# Patient Record
Sex: MALE | Race: WHITE
[De-identification: names, ages, dates, MRNs, and addresses within clinical notes are randomized per-mention and may not be internally consistent; named-entity substitution may affect disease eponyms.]

---

## 2022-01-21 ENCOUNTER — HOSPITAL ENCOUNTER (INPATIENT)
Dept: HOSPITAL 19 - SURG | Age: 87
LOS: 10 days | Discharge: NURSING, REHAB OR LONG-TERM CARE | DRG: 444 | End: 2022-01-31
Attending: INTERNAL MEDICINE | Admitting: INTERNAL MEDICINE
Payer: MEDICARE

## 2022-01-21 VITALS — HEIGHT: 70 IN | BODY MASS INDEX: 40.4 KG/M2 | WEIGHT: 282.19 LBS

## 2022-01-21 VITALS — DIASTOLIC BLOOD PRESSURE: 57 MMHG | TEMPERATURE: 99.9 F | HEART RATE: 90 BPM | SYSTOLIC BLOOD PRESSURE: 122 MMHG

## 2022-01-21 DIAGNOSIS — Z79.82: ICD-10-CM

## 2022-01-21 DIAGNOSIS — I25.10: ICD-10-CM

## 2022-01-21 DIAGNOSIS — E66.2: ICD-10-CM

## 2022-01-21 DIAGNOSIS — J96.22: ICD-10-CM

## 2022-01-21 DIAGNOSIS — N47.1: ICD-10-CM

## 2022-01-21 DIAGNOSIS — H91.90: ICD-10-CM

## 2022-01-21 DIAGNOSIS — L30.4: ICD-10-CM

## 2022-01-21 DIAGNOSIS — I13.0: ICD-10-CM

## 2022-01-21 DIAGNOSIS — M10.9: ICD-10-CM

## 2022-01-21 DIAGNOSIS — N18.9: ICD-10-CM

## 2022-01-21 DIAGNOSIS — E87.2: ICD-10-CM

## 2022-01-21 DIAGNOSIS — N48.1: ICD-10-CM

## 2022-01-21 DIAGNOSIS — Z20.822: ICD-10-CM

## 2022-01-21 DIAGNOSIS — Z79.891: ICD-10-CM

## 2022-01-21 DIAGNOSIS — Z66: ICD-10-CM

## 2022-01-21 DIAGNOSIS — R41.0: ICD-10-CM

## 2022-01-21 DIAGNOSIS — R65.10: ICD-10-CM

## 2022-01-21 DIAGNOSIS — E03.9: ICD-10-CM

## 2022-01-21 DIAGNOSIS — Z99.81: ICD-10-CM

## 2022-01-21 DIAGNOSIS — I50.9: ICD-10-CM

## 2022-01-21 DIAGNOSIS — J44.1: ICD-10-CM

## 2022-01-21 DIAGNOSIS — N17.9: ICD-10-CM

## 2022-01-21 DIAGNOSIS — K80.20: Primary | ICD-10-CM

## 2022-01-21 PROCEDURE — 5A09457 ASSISTANCE WITH RESPIRATORY VENTILATION, 24-96 CONSECUTIVE HOURS, CONTINUOUS POSITIVE AIRWAY PRESSURE: ICD-10-PCS | Performed by: STUDENT IN AN ORGANIZED HEALTH CARE EDUCATION/TRAINING PROGRAM

## 2022-01-22 VITALS — DIASTOLIC BLOOD PRESSURE: 56 MMHG | HEART RATE: 109 BPM | TEMPERATURE: 98.1 F | SYSTOLIC BLOOD PRESSURE: 114 MMHG

## 2022-01-22 VITALS — HEART RATE: 86 BPM | SYSTOLIC BLOOD PRESSURE: 109 MMHG | TEMPERATURE: 98.8 F | DIASTOLIC BLOOD PRESSURE: 48 MMHG

## 2022-01-22 VITALS — TEMPERATURE: 97.5 F | SYSTOLIC BLOOD PRESSURE: 104 MMHG | DIASTOLIC BLOOD PRESSURE: 49 MMHG | HEART RATE: 78 BPM

## 2022-01-22 VITALS — DIASTOLIC BLOOD PRESSURE: 43 MMHG | HEART RATE: 92 BPM | SYSTOLIC BLOOD PRESSURE: 115 MMHG | TEMPERATURE: 97.2 F

## 2022-01-22 VITALS — HEART RATE: 83 BPM | SYSTOLIC BLOOD PRESSURE: 97 MMHG | TEMPERATURE: 99.3 F | DIASTOLIC BLOOD PRESSURE: 55 MMHG

## 2022-01-22 VITALS — HEART RATE: 91 BPM | SYSTOLIC BLOOD PRESSURE: 108 MMHG | DIASTOLIC BLOOD PRESSURE: 57 MMHG | TEMPERATURE: 98.6 F

## 2022-01-22 VITALS — SYSTOLIC BLOOD PRESSURE: 113 MMHG | TEMPERATURE: 99.2 F | HEART RATE: 90 BPM | DIASTOLIC BLOOD PRESSURE: 64 MMHG

## 2022-01-22 LAB
ANION GAP SERPL CALC-SCNC: 13 MMOL/L (ref 7–16)
BASE EXCESS BLDA CALC-SCNC: 0.7 MMOL/L (ref -2–2)
BASE EXCESS BLDA CALC-SCNC: 2.4 MMOL/L (ref -2–2)
BASOPHILS # BLD: 0 K/MM3 (ref 0–0.2)
BASOPHILS NFR BLD AUTO: 0.2 % (ref 0–2)
BUN SERPL-MCNC: 35 MG/DL (ref 8–26)
CALCIUM SERPL-MCNC: 9 MG/DL (ref 8.4–10.2)
CHLORIDE SERPL-SCNC: 102 MMOL/L (ref 98–107)
CO2 BLDA-SCNC: 34.4 MMOL/L
CO2 BLDA-SCNC: 35.6 MMOL/L
CO2 SERPL-SCNC: 25 MMOL/L (ref 23–31)
CREAT SERPL-SCNC: 1.02 MG/DL (ref 0.72–1.25)
EOSINOPHIL # BLD: 0 K/MM3 (ref 0–0.7)
EOSINOPHIL NFR BLD: 0.1 % (ref 0–4)
ERYTHROCYTE [DISTWIDTH] IN BLOOD BY AUTOMATED COUNT: 14.2 % (ref 11.5–14.5)
GLUCOSE SERPL-MCNC: 92 MG/DL (ref 70–99)
GRANULOCYTES # BLD AUTO: 86.6 % (ref 42.2–75.2)
HCO3 BLDA-SCNC: 31.7 MEQ/L (ref 22–26)
HCO3 BLDA-SCNC: 33 MEQ/L (ref 22–26)
HCT VFR BLD AUTO: 44.9 % (ref 42–52)
HGB BLD-MCNC: 13.7 G/DL (ref 13.5–18)
INHALED O2 CONCENTRATION: 50 %
LYMPHOCYTES # BLD: 0.7 K/MM3 (ref 1.2–3.4)
LYMPHOCYTES NFR BLD: 4 % (ref 20–51)
MAGNESIUM SERPL-MCNC: 2.1 MG/DL (ref 1.6–2.6)
MCH RBC QN AUTO: 29 PG (ref 27–31)
MCHC RBC AUTO-ENTMCNC: 31 G/DL (ref 33–37)
MCV RBC AUTO: 94 FL (ref 80–100)
MONOCYTES # BLD: 1.4 K/MM3 (ref 0.1–0.6)
MONOCYTES NFR BLD AUTO: 8.5 % (ref 1.7–9.3)
NEUTROPHILS # BLD: 14.2 K/MM3 (ref 1.4–6.5)
PCO2 BLDA: 84.1 MMHG (ref 35–45)
PCO2 BLDA: 86.9 MMHG (ref 35–45)
PHOSPHATE SERPL-MCNC: 4 MG/DL (ref 2.3–4.7)
PLATELET # BLD AUTO: 214 K/MM3 (ref 130–400)
PMV BLD AUTO: 11.9 FL (ref 7.4–10.4)
PO2 BLDA: 65.9 MMHG (ref 80–100)
PO2 BLDA: 76.8 MMHG (ref 80–100)
POTASSIUM SERPL-SCNC: 4.7 MMOL/L (ref 3.5–4.5)
RBC # BLD AUTO: 4.79 M/MM3 (ref 4.2–5.6)
SAO2 % BLDA: 93.2 % (ref 92–100)
SAO2 % BLDA: 94.9 % (ref 92–100)
SODIUM SERPL-SCNC: 140 MMOL/L (ref 136–145)

## 2022-01-22 NOTE — NUR
Bedside shift report received, assumed care for night shift. Assessment
complete. A&Ox4-very drowsy and lethargic. MIKE Sheth-Hospitalist has been in
to evaluate patient due to increased O2 needs/tachypnea. O2 currently at
6L/HFNC with O2 sats in mid 90s. Denies pain/nausea. Lungs with fine crackles
all fields. Plan of care discussed for this shift to include meds/O2/breathing
tx/calling for questions or concerns. Call light in reach. Will monitor.

## 2022-01-22 NOTE — NUR
PT'S PO MEDS WERE HELD THIS MORNING DUE TO PATIENT BEING NPO FOR POSSIBLE
PROCEDURE. DR. ROMERO DOES NOT PLAN TO PROCEED WITH PROCEDURE, PATIENT HAS
BEEN ADVANCED TO LIQUID DIET AND ORAL MEDS ARE NOW BEING ADMINISTERED.
HOWEVER, PATIENT'S BLOOD PRESSURE IS SOFT (108/57) AND RN CONSULTED WITH DR. MURRELL WHO GAVE VERBAL ORDER TO HOLD ORAL BLOOD PRESSURE MEDS BUT ADMINISTER
LASIX.

## 2022-01-22 NOTE — NUR
SW met with patient to complete intake. Patient states that he lives alone and
his next of contact is daughter Ashley [phone]. Patient provides that he
uses a walker currently, and states for the most part is independent with ADLs
Patient provides that he does not utilize any home health services at this
time. Patient provides that his PCP is Dr Yanes, pharmacy is [facility],
and DPOA is his daugther which he has documenation at his home. Patient's plan
is to return to his home up on DC and has no concerns with doing so. SW will
continue to follow.
 
DC plan: home/lives alone (may need placement)

## 2022-01-22 NOTE — NUR
PATIENT TO FLOOR AROUND 2000 BY EMS STAFF. VSS. ASSESSMENT DONE. EMAR
COMPLETED WITH HELP OF PATIENTS DAUGHTER, NORI OVER THE PHONE. DENIES PAIN.
PATIENT WAS INCONTINENT OF URINE AND LINENS WERE CHANGED. PATIENT NOW WEARING
BRIEF. RED AND EXCORIATED IN SKIN FOLDS AND ON THE TIP OF THE PENIS/FORESKIN.
20 G TO L FA WITH IV ABX INFUSING INTERMITTENTLY. HAS BEEN NPO SINCE MIDNIGHT.
4 L O2 INFUSING VIA NC. HAS BEEN RESTING IN BED.

## 2022-01-23 VITALS — DIASTOLIC BLOOD PRESSURE: 43 MMHG | HEART RATE: 78 BPM | SYSTOLIC BLOOD PRESSURE: 104 MMHG | TEMPERATURE: 98 F

## 2022-01-23 VITALS — DIASTOLIC BLOOD PRESSURE: 38 MMHG | TEMPERATURE: 97.2 F | HEART RATE: 95 BPM | SYSTOLIC BLOOD PRESSURE: 117 MMHG

## 2022-01-23 VITALS — TEMPERATURE: 98.9 F | HEART RATE: 86 BPM | SYSTOLIC BLOOD PRESSURE: 94 MMHG | DIASTOLIC BLOOD PRESSURE: 48 MMHG

## 2022-01-23 VITALS — TEMPERATURE: 99.1 F | HEART RATE: 65 BPM | SYSTOLIC BLOOD PRESSURE: 116 MMHG | DIASTOLIC BLOOD PRESSURE: 56 MMHG

## 2022-01-23 VITALS — HEART RATE: 78 BPM | SYSTOLIC BLOOD PRESSURE: 136 MMHG | TEMPERATURE: 98.4 F | DIASTOLIC BLOOD PRESSURE: 69 MMHG

## 2022-01-23 LAB
ANION GAP SERPL CALC-SCNC: 13 MMOL/L (ref 7–16)
BASE EXCESS BLDA CALC-SCNC: -1.2 MMOL/L (ref -2–2)
BASE EXCESS BLDA CALC-SCNC: 1.2 MMOL/L (ref -2–2)
BASE EXCESS BLDA CALC-SCNC: 3.7 MMOL/L (ref -2–2)
BASOPHILS # BLD: 0 K/MM3 (ref 0–0.2)
BASOPHILS NFR BLD AUTO: 0.1 % (ref 0–2)
BUN SERPL-MCNC: 42 MG/DL (ref 8–26)
CALCIUM SERPL-MCNC: 8.6 MG/DL (ref 8.4–10.2)
CHLORIDE SERPL-SCNC: 100 MMOL/L (ref 98–107)
CO2 BLDA-SCNC: 28.9 MMOL/L
CO2 BLDA-SCNC: 29.9 MMOL/L
CO2 BLDA-SCNC: 34.4 MMOL/L
CO2 SERPL-SCNC: 25 MMOL/L (ref 23–31)
CREAT SERPL-SCNC: 1.33 MG/DL (ref 0.72–1.25)
EOSINOPHIL # BLD: 0 K/MM3 (ref 0–0.7)
EOSINOPHIL NFR BLD: 0 % (ref 0–4)
ERYTHROCYTE [DISTWIDTH] IN BLOOD BY AUTOMATED COUNT: 14.3 % (ref 11.5–14.5)
GLUCOSE SERPL-MCNC: 144 MG/DL (ref 70–99)
GRANULOCYTES # BLD AUTO: 84.1 % (ref 42.2–75.2)
HCO3 BLDA-SCNC: 27 MEQ/L (ref 22–26)
HCO3 BLDA-SCNC: 28.2 MEQ/L (ref 22–26)
HCO3 BLDA-SCNC: 32.3 MEQ/L (ref 22–26)
HCT VFR BLD AUTO: 40.3 % (ref 42–52)
HGB BLD-MCNC: 12 G/DL (ref 13.5–18)
INHALED O2 CONCENTRATION: 50 %
INHALED O2 CONCENTRATION: 50 %
LYMPHOCYTES # BLD: 0.7 K/MM3 (ref 1.2–3.4)
LYMPHOCYTES NFR BLD: 5 % (ref 20–51)
MCH RBC QN AUTO: 28 PG (ref 27–31)
MCHC RBC AUTO-ENTMCNC: 30 G/DL (ref 33–37)
MCV RBC AUTO: 94 FL (ref 80–100)
MONOCYTES # BLD: 1.5 K/MM3 (ref 0.1–0.6)
MONOCYTES NFR BLD AUTO: 10.1 % (ref 1.7–9.3)
NEUTROPHILS # BLD: 12.2 K/MM3 (ref 1.4–6.5)
PCO2 BLDA: 55.6 MMHG (ref 35–45)
PCO2 BLDA: 61.6 MMHG (ref 35–45)
PCO2 BLDA: 69.1 MMHG (ref 35–45)
PLATELET # BLD AUTO: 272 K/MM3 (ref 130–400)
PMV BLD AUTO: 11 FL (ref 7.4–10.4)
PO2 BLDA: 65.6 MMHG (ref 80–100)
PO2 BLDA: 67.4 MMHG (ref 80–100)
PO2 BLDA: 69.7 MMHG (ref 80–100)
POTASSIUM SERPL-SCNC: 5.2 MMOL/L (ref 3.5–4.5)
RBC # BLD AUTO: 4.31 M/MM3 (ref 4.2–5.6)
SAO2 % BLDA: 93.5 % (ref 92–100)
SAO2 % BLDA: 94.3 % (ref 92–100)
SAO2 % BLDA: 94.3 % (ref 92–100)
SODIUM SERPL-SCNC: 138 MMOL/L (ref 136–145)

## 2022-01-23 NOTE — NUR
Patient assessed around 2035. Alert, oriented to self. Redirected to time,
place, and situation, but forgetful. Denies pain and discomfort. INT to left
hand. IV ABX running per orders. Denies SOB and dyspnea. On BIPAP per orders.
Indwelling ritter catheter patent, draining clear yellow urine via dependent
drainage. 3+ edema BLE. In bed with call light within reach. Bed alarm on.

## 2022-01-23 NOTE — NUR
THUY informed patient would need assistance finding placement due to clinical
concerns. THUY called daughter Ashley who stated she understands the concern
for placement and would prefer a facilty that is closer to her. THUY asked
daugher of choices she prefered and daughter stated [facility] in [place] or
[facility]. THUY faxed ref documentation to both facilities. THUY
will continue to follow.

## 2022-01-23 NOTE — NUR
Much more alert now. BiPaP continues and tolerating well. Repositioned in bed
at this time-domingo care given. Incontinent of a large amount of urine. Denies
current needs. Call light in reach. Will monitor.

## 2022-01-23 NOTE — NUR
CPAP REMOVED FOR PATIENT TO EAT BREAKFAST. TRAY SET UP AND PATIENT ASSISTED
WITH LIQUID DIET FOR BREAKFAST. PATIENT STATES HE IS VERY THIRSTY. PATIENT
NOTED TO BE VERY UNSTEADY AND SHAKING/DROPPING ITEMS WHILTE ATTEMPTING TO EAT.

## 2022-01-23 NOTE — NUR
PATIENT IS RESTING COMFORTABLY IN BED WITH CPAP IN PLACE. PATIENT IS ALERT AND
ORIENTED TO PERSON. PATIENT DENIES ANY NEEDS AT THIS TIME. WILL CONTINUE TO
MONITOR.

## 2022-01-23 NOTE — NUR
Patient remains on BiPaP and is tolerating well. Denies pain/nausea/shortness
of breath. Vs remain stable.

## 2022-01-24 VITALS — TEMPERATURE: 98.3 F | DIASTOLIC BLOOD PRESSURE: 83 MMHG | HEART RATE: 94 BPM | SYSTOLIC BLOOD PRESSURE: 139 MMHG

## 2022-01-24 VITALS — SYSTOLIC BLOOD PRESSURE: 122 MMHG | DIASTOLIC BLOOD PRESSURE: 79 MMHG | TEMPERATURE: 98.5 F | HEART RATE: 84 BPM

## 2022-01-24 VITALS — TEMPERATURE: 98.3 F | HEART RATE: 116 BPM | SYSTOLIC BLOOD PRESSURE: 125 MMHG | DIASTOLIC BLOOD PRESSURE: 58 MMHG

## 2022-01-24 VITALS — DIASTOLIC BLOOD PRESSURE: 56 MMHG | SYSTOLIC BLOOD PRESSURE: 129 MMHG | HEART RATE: 94 BPM | TEMPERATURE: 98.4 F

## 2022-01-24 VITALS — TEMPERATURE: 98.4 F | SYSTOLIC BLOOD PRESSURE: 138 MMHG | HEART RATE: 95 BPM | DIASTOLIC BLOOD PRESSURE: 73 MMHG

## 2022-01-24 VITALS — SYSTOLIC BLOOD PRESSURE: 107 MMHG | HEART RATE: 75 BPM | TEMPERATURE: 97.9 F | DIASTOLIC BLOOD PRESSURE: 66 MMHG

## 2022-01-24 VITALS — TEMPERATURE: 98.3 F | HEART RATE: 106 BPM | SYSTOLIC BLOOD PRESSURE: 95 MMHG | DIASTOLIC BLOOD PRESSURE: 46 MMHG

## 2022-01-24 LAB
ANION GAP SERPL CALC-SCNC: 12 MMOL/L (ref 7–16)
BASE EXCESS BLDA CALC-SCNC: 2.4 MMOL/L (ref -2–2)
BUN SERPL-MCNC: 41 MG/DL (ref 8–26)
CALCIUM SERPL-MCNC: 9.2 MG/DL (ref 8.4–10.2)
CHLORIDE SERPL-SCNC: 99 MMOL/L (ref 98–107)
CO2 BLDA-SCNC: 27 MMOL/L
CO2 SERPL-SCNC: 26 MMOL/L (ref 23–31)
CREAT SERPL-SCNC: 1.14 MG/DL (ref 0.72–1.25)
ERYTHROCYTE [DISTWIDTH] IN BLOOD BY AUTOMATED COUNT: 13.6 % (ref 11.5–14.5)
GLUCOSE SERPL-MCNC: 158 MG/DL (ref 70–99)
HCO3 BLDA-SCNC: 25.8 MEQ/L (ref 22–26)
HCT VFR BLD AUTO: 39.8 % (ref 42–52)
HGB BLD-MCNC: 12.5 G/DL (ref 13.5–18)
INHALED O2 CONCENTRATION: 45 %
LYMPHOCYTES NFR BLD MANUAL: 2 % (ref 20–51)
MCH RBC QN AUTO: 29 PG (ref 27–31)
MCHC RBC AUTO-ENTMCNC: 31 G/DL (ref 33–37)
MCV RBC AUTO: 91 FL (ref 80–100)
NEUTS BAND NFR BLD: 4 % (ref 0–10)
NEUTS SEG NFR BLD MANUAL: 94 % (ref 42–75.2)
PCO2 BLDA: 36.5 MMHG (ref 35–45)
PLATELET # BLD AUTO: 269 K/MM3 (ref 130–400)
PLATELET BLD QL SMEAR: NORMAL
PMV BLD AUTO: 10.8 FL (ref 7.4–10.4)
PO2 BLDA: 58.4 MMHG (ref 80–100)
POTASSIUM SERPL-SCNC: 4.8 MMOL/L (ref 3.5–4.5)
RBC # BLD AUTO: 4.39 M/MM3 (ref 4.2–5.6)
SAO2 % BLDA: 93.3 % (ref 92–100)
SODIUM SERPL-SCNC: 137 MMOL/L (ref 136–145)

## 2022-01-24 NOTE — NUR
Report received. Pt restless in bed, pulling at IV and BIPAP mask. Incontinent
of loose stool, cleaned up and repositioned in bed. Remains agitated and
difficult to console.

## 2022-01-24 NOTE — NUR
Pt more alert, calm, and cooperative throughout day. Partially oriented. At
this time, pt taken off BIPAP and placed on 6 lpm O2 HFNC with sats 91-93%.

## 2022-01-24 NOTE — NUR
Patient has been awake most of shift. Has been pulling at BIPAP and taking off
multiple times. Each time staff redirected which would only help for short
periods of time. Denies pain and discomfort. In bed with call light within
reach. Bed alarm on.

## 2022-01-24 NOTE — NUR
PATIENT DOING WELL TONIGHT. VERY DROWSY. STARTED AT 5 L O2 VIA NC THIS EVENING
AND WAS PLACED ON BIPAP FOR HS. NO S/S OF PAIN NOTED. IV TO L WRIST PATENT AND
IV ABX INFUSING INTERMITTENTLY. DESENEX APPLIED IN SKIN FOLDS AND CREAM
APPLIED TO PENIS. ART TO DD WITH CLEAR YELLOW URINE OUT. RESTING IN BED,
CALL LIGHT WITHIN REACH.

## 2022-01-24 NOTE — NUR
Shift assessment complete. Pt lying in bed, has been agitated this morning and
pulled BIPAP off and IV out. IV catheter tip intact, site w/o s/s
complication. O2 sats 97% on RA, RT notified and okay with keeping BIPAP off
for now. Pt calmer and more cooperative at this time. Drowsy and disoriented.
New 20 gauge IV started to left wrist and secured w/tape and ACE wrap.
Breathing shallow and labored w/repositioning. Bed alarm on and  call light in
reach.

## 2022-01-24 NOTE — NUR
contacted Yunior at [facility] who advised they are
currently reviewing referral and may have openings later this week. THUY
contacted [facility] and was advised they are currently full.
 
THUY followed up with patient's daughter, Ashley who advised her brother, Toby
(ph#[phone]) is at bedside and was advised by the physician that a [facility]
eval may be necessary. THUY followed up with Toby who stated he was agreeable to
this. Toby and Ashley prefer the [place] location, however THUY advised that
this may not be possible depending on bed availability. Toby verbalized
understanding. THUY contacted Chris at [facility] [place] and faxed referral.
 
Discharge Plan: [facility]

## 2022-01-25 VITALS — HEART RATE: 87 BPM | SYSTOLIC BLOOD PRESSURE: 108 MMHG | DIASTOLIC BLOOD PRESSURE: 63 MMHG | TEMPERATURE: 98.3 F

## 2022-01-25 VITALS — SYSTOLIC BLOOD PRESSURE: 119 MMHG | HEART RATE: 80 BPM | TEMPERATURE: 97.6 F | DIASTOLIC BLOOD PRESSURE: 74 MMHG

## 2022-01-25 VITALS — HEART RATE: 72 BPM | TEMPERATURE: 98.6 F | SYSTOLIC BLOOD PRESSURE: 105 MMHG | DIASTOLIC BLOOD PRESSURE: 69 MMHG

## 2022-01-25 VITALS — DIASTOLIC BLOOD PRESSURE: 71 MMHG | HEART RATE: 85 BPM | SYSTOLIC BLOOD PRESSURE: 119 MMHG | TEMPERATURE: 98.1 F

## 2022-01-25 VITALS — TEMPERATURE: 97.7 F | HEART RATE: 97 BPM | DIASTOLIC BLOOD PRESSURE: 61 MMHG | SYSTOLIC BLOOD PRESSURE: 126 MMHG

## 2022-01-25 LAB
ALBUMIN SERPL-MCNC: 2.5 GM/DL (ref 3.4–4.8)
ALP SERPL-CCNC: 111 U/L (ref 40–150)
ALT SERPL-CCNC: 61 U/L (ref 0–55)
ANION GAP SERPL CALC-SCNC: 13 MMOL/L (ref 7–16)
AST SERPL-CCNC: 72 U/L (ref 5–34)
BILIRUB SERPL-MCNC: 0.4 MG/DL (ref 0.2–1.2)
BUN SERPL-MCNC: 49 MG/DL (ref 8–26)
CALCIUM SERPL-MCNC: 9.3 MG/DL (ref 8.4–10.2)
CHLORIDE SERPL-SCNC: 102 MMOL/L (ref 98–107)
CO2 SERPL-SCNC: 26 MMOL/L (ref 23–31)
CREAT SERPL-SCNC: 1.44 MG/DL (ref 0.72–1.25)
ERYTHROCYTE [DISTWIDTH] IN BLOOD BY AUTOMATED COUNT: 13.6 % (ref 11.5–14.5)
GLUCOSE SERPL-MCNC: 119 MG/DL (ref 70–99)
HCT VFR BLD AUTO: 40.2 % (ref 42–52)
HGB BLD-MCNC: 12.4 G/DL (ref 13.5–18)
LYMPHOCYTES NFR BLD MANUAL: 5 % (ref 20–51)
MCH RBC QN AUTO: 28 PG (ref 27–31)
MCHC RBC AUTO-ENTMCNC: 31 G/DL (ref 33–37)
MCV RBC AUTO: 91 FL (ref 80–100)
MONOCYTES NFR BLD: 4 % (ref 1.7–9.3)
NEUTS BAND NFR BLD: 5 % (ref 0–10)
NEUTS SEG NFR BLD MANUAL: 86 % (ref 42–75.2)
PLATELET # BLD AUTO: 295 K/MM3 (ref 130–400)
PLATELET BLD QL SMEAR: NORMAL
PMV BLD AUTO: 10.6 FL (ref 7.4–10.4)
POTASSIUM SERPL-SCNC: 4.9 MMOL/L (ref 3.5–4.5)
PROT SERPL-MCNC: 5.9 GM/DL (ref 6.2–8.1)
RBC # BLD AUTO: 4.44 M/MM3 (ref 4.2–5.6)
SODIUM SERPL-SCNC: 141 MMOL/L (ref 136–145)

## 2022-01-25 NOTE — NUR
Pt doing well tonight.  He is not having any pain complaints during
assessment.  Just went in to reposition pt and he had some complaints of his
back hurting, denies any needs.  Pt does have redness under skin folds, powder
applied.  Catheter care completed and ointment applied per order.  No needs
verbalized, call light within reach

## 2022-01-25 NOTE — NUR
followed up with Chris who advised patient is approved to admit,
however they are waiting on bed availability.
 
Discharge Plan: [facility]

## 2022-01-25 NOTE — NUR
PT A&O X 3 THIS SHIFT. APPROPRIATELY WEARS HIS NC. PT ABLE TO STAND AT EDGE OF
BED WITH THERAPY TODAY. HAD BED BATH. DENIES ABD PAIN AND IS TOLERATING FULL
LIQUID DIET. PLAN TO DC TO [facility] WHEN BED IS AVAILABLE.

## 2022-01-26 VITALS — HEART RATE: 69 BPM | SYSTOLIC BLOOD PRESSURE: 127 MMHG | TEMPERATURE: 97.5 F | DIASTOLIC BLOOD PRESSURE: 44 MMHG

## 2022-01-26 VITALS — DIASTOLIC BLOOD PRESSURE: 75 MMHG | SYSTOLIC BLOOD PRESSURE: 110 MMHG | TEMPERATURE: 97.8 F | HEART RATE: 98 BPM

## 2022-01-26 VITALS — HEART RATE: 107 BPM | DIASTOLIC BLOOD PRESSURE: 55 MMHG | SYSTOLIC BLOOD PRESSURE: 112 MMHG | TEMPERATURE: 98 F

## 2022-01-26 VITALS — HEART RATE: 87 BPM | SYSTOLIC BLOOD PRESSURE: 133 MMHG | DIASTOLIC BLOOD PRESSURE: 75 MMHG | TEMPERATURE: 98.2 F

## 2022-01-26 VITALS — TEMPERATURE: 98.3 F | SYSTOLIC BLOOD PRESSURE: 109 MMHG | DIASTOLIC BLOOD PRESSURE: 75 MMHG | HEART RATE: 88 BPM

## 2022-01-26 VITALS — HEART RATE: 89 BPM | TEMPERATURE: 97.8 F | DIASTOLIC BLOOD PRESSURE: 77 MMHG | SYSTOLIC BLOOD PRESSURE: 142 MMHG

## 2022-01-26 LAB
ANION GAP SERPL CALC-SCNC: 11 MMOL/L (ref 7–16)
BASE EXCESS BLDA CALC-SCNC: 10.2 MMOL/L (ref -2–2)
BUN SERPL-MCNC: 41 MG/DL (ref 8–26)
CALCIUM SERPL-MCNC: 9.3 MG/DL (ref 8.4–10.2)
CHLORIDE SERPL-SCNC: 100 MMOL/L (ref 98–107)
CO2 BLDA-SCNC: 38.7 MMOL/L
CO2 SERPL-SCNC: 30 MMOL/L (ref 23–31)
CREAT SERPL-SCNC: 1.14 MG/DL (ref 0.72–1.25)
ERYTHROCYTE [DISTWIDTH] IN BLOOD BY AUTOMATED COUNT: 13.5 % (ref 11.5–14.5)
GLUCOSE SERPL-MCNC: 125 MG/DL (ref 70–99)
HCO3 BLDA-SCNC: 36.9 MEQ/L (ref 22–26)
HCT VFR BLD AUTO: 42 % (ref 42–52)
HGB BLD-MCNC: 12.6 G/DL (ref 13.5–18)
INHALED O2 CONCENTRATION: 45 %
LYMPHOCYTES NFR BLD MANUAL: 2 % (ref 20–51)
MCH RBC QN AUTO: 28 PG (ref 27–31)
MCHC RBC AUTO-ENTMCNC: 30 G/DL (ref 33–37)
MCV RBC AUTO: 94 FL (ref 80–100)
METAMYELOCYTES NFR BLD MANUAL: 1 % (ref 0–0)
MONOCYTES NFR BLD: 3 % (ref 1.7–9.3)
NEUTS BAND NFR BLD: 3 % (ref 0–10)
NEUTS SEG NFR BLD MANUAL: 91 % (ref 42–75.2)
PCO2 BLDA: 59.3 MMHG (ref 35–45)
PLATELET # BLD AUTO: 304 K/MM3 (ref 130–400)
PLATELET BLD QL SMEAR: NORMAL
PMV BLD AUTO: 10.5 FL (ref 7.4–10.4)
PO2 BLDA: 72 MMHG (ref 80–100)
POTASSIUM SERPL-SCNC: 4.8 MMOL/L (ref 3.5–4.5)
RBC # BLD AUTO: 4.46 M/MM3 (ref 4.2–5.6)
SAO2 % BLDA: 95.1 % (ref 92–100)
SODIUM SERPL-SCNC: 141 MMOL/L (ref 136–145)

## 2022-01-26 NOTE — NUR
Pt consistently ringing the call light as he reports he is not comfortable.
Pt has been repositioned to both the right and left sides as well as his back.
 Pt being repositioned back to his right side and pt had incontinent stool.
Pt was unaware that he had gone.  Cleaned pt up and new bed pad applied.  Pt
placed on his right side with bipap in place.  Call light within reach

## 2022-01-26 NOTE — NUR
Pt continued to be repositioned throughout the night.  Pt just moved to his
left side.  Pt has no incontinent stool at this time.

## 2022-01-26 NOTE — NUR
Bedside shift report received, assumed care for nigth shift. Assessment
complete. A&Ox4. Denies pain/nausea. Short of breath with activity. Occasional
upper lobe bilat insp/exp wheezing noted. Bases diminished. O2@5L/HFNC with
adequate saturations. Santana cath with pink tinged urine. INT to left wrist
flushes without difficulty. Plan of care discussed for this shift to include
meds/BiPaP/calling for questions/concerns. Verbalizes understanding. Call
light in reach. Will monitor.

## 2022-01-26 NOTE — NUR
Patient back to bed. Patient was weak on his feet. Sit to stand was used with
instuction to safety get patient back to bed. Patient did have a loose
incontinent stool. Pericares given. Santana remains to DD with adequate urine
output.  Patient did well with full liquid dinner.  Patient was assisted with
shaving this evening. Patient reminded he will need to use bipap tonight. fan
provided for comfort & his request. His daughter has been at bedside. Bedside
report to Tiana DAVIDSON.

## 2022-01-26 NOTE — NUR
Shift assessment performed. Patient alert and oriented x3. VSS. Patient here
for abdominal pain, weakness, and a recent fall. Patient does not recall
incident. Patient denies any pain at this time. Patient incontinent of stool,
cleaned, and application of desenex on posterior. Patient is resting in bed
with call light near.

## 2022-01-27 VITALS — SYSTOLIC BLOOD PRESSURE: 122 MMHG | HEART RATE: 79 BPM | TEMPERATURE: 98 F | DIASTOLIC BLOOD PRESSURE: 72 MMHG

## 2022-01-27 VITALS — DIASTOLIC BLOOD PRESSURE: 77 MMHG | TEMPERATURE: 97.6 F | HEART RATE: 73 BPM | SYSTOLIC BLOOD PRESSURE: 98 MMHG

## 2022-01-27 VITALS — HEART RATE: 84 BPM | DIASTOLIC BLOOD PRESSURE: 79 MMHG | SYSTOLIC BLOOD PRESSURE: 121 MMHG | TEMPERATURE: 97.9 F

## 2022-01-27 VITALS — HEART RATE: 102 BPM | TEMPERATURE: 98.2 F | DIASTOLIC BLOOD PRESSURE: 77 MMHG | SYSTOLIC BLOOD PRESSURE: 112 MMHG

## 2022-01-27 VITALS — DIASTOLIC BLOOD PRESSURE: 84 MMHG | TEMPERATURE: 97.8 F | SYSTOLIC BLOOD PRESSURE: 126 MMHG | HEART RATE: 74 BPM

## 2022-01-27 VITALS — SYSTOLIC BLOOD PRESSURE: 109 MMHG | TEMPERATURE: 98.2 F | HEART RATE: 143 BPM | DIASTOLIC BLOOD PRESSURE: 89 MMHG

## 2022-01-27 VITALS — SYSTOLIC BLOOD PRESSURE: 98 MMHG | DIASTOLIC BLOOD PRESSURE: 53 MMHG | HEART RATE: 81 BPM | TEMPERATURE: 97.8 F

## 2022-01-27 LAB
ALBUMIN SERPL-MCNC: 2.6 GM/DL (ref 3.4–4.8)
ALP SERPL-CCNC: 91 U/L (ref 40–150)
ALT SERPL-CCNC: 56 U/L (ref 0–55)
ANION GAP SERPL CALC-SCNC: 10 MMOL/L (ref 7–16)
AST SERPL-CCNC: 36 U/L (ref 5–34)
BASE EXCESS BLDA CALC-SCNC: 8.6 MMOL/L (ref -2–2)
BASOPHILS # BLD: 0 K/MM3 (ref 0–0.2)
BASOPHILS NFR BLD AUTO: 0.2 % (ref 0–2)
BILIRUB SERPL-MCNC: 0.4 MG/DL (ref 0.2–1.2)
BUN SERPL-MCNC: 33 MG/DL (ref 8–26)
CALCIUM SERPL-MCNC: 9.3 MG/DL (ref 8.4–10.2)
CHLORIDE SERPL-SCNC: 98 MMOL/L (ref 98–107)
CO2 BLDA-SCNC: 34.9 MMOL/L
CO2 SERPL-SCNC: 35 MMOL/L (ref 23–31)
CREAT SERPL-SCNC: 1.06 MG/DL (ref 0.72–1.25)
EOSINOPHIL # BLD: 0 K/MM3 (ref 0–0.7)
EOSINOPHIL NFR BLD: 0 % (ref 0–4)
ERYTHROCYTE [DISTWIDTH] IN BLOOD BY AUTOMATED COUNT: 13.6 % (ref 11.5–14.5)
GLUCOSE SERPL-MCNC: 89 MG/DL (ref 70–99)
GRANULOCYTES # BLD AUTO: 80.8 % (ref 42.2–75.2)
HCO3 BLDA-SCNC: 33.4 MEQ/L (ref 22–26)
HCT VFR BLD AUTO: 44.3 % (ref 42–52)
HGB BLD-MCNC: 13.6 G/DL (ref 13.5–18)
INHALED O2 CONCENTRATION: 45 %
LYMPHOCYTES # BLD: 0.7 K/MM3 (ref 1.2–3.4)
LYMPHOCYTES NFR BLD: 7.2 % (ref 20–51)
MCH RBC QN AUTO: 28 PG (ref 27–31)
MCHC RBC AUTO-ENTMCNC: 31 G/DL (ref 33–37)
MCV RBC AUTO: 92 FL (ref 80–100)
MONOCYTES # BLD: 1 K/MM3 (ref 0.1–0.6)
MONOCYTES NFR BLD AUTO: 10.5 % (ref 1.7–9.3)
NEUTROPHILS # BLD: 8 K/MM3 (ref 1.4–6.5)
PCO2 BLDA: 46.9 MMHG (ref 35–45)
PLATELET # BLD AUTO: 303 K/MM3 (ref 130–400)
PMV BLD AUTO: 10.2 FL (ref 7.4–10.4)
PO2 BLDA: 73.9 MMHG (ref 80–100)
POTASSIUM SERPL-SCNC: 4.5 MMOL/L (ref 3.5–4.5)
PROT SERPL-MCNC: 5.7 GM/DL (ref 6.2–8.1)
RBC # BLD AUTO: 4.8 M/MM3 (ref 4.2–5.6)
SAO2 % BLDA: 95.7 % (ref 92–100)
SODIUM SERPL-SCNC: 143 MMOL/L (ref 136–145)

## 2022-01-27 NOTE — NUR
Bedside shift report received, assumed care for nigth shift. Assessment
complete. A&Ox4. VS stable. Denies pain/nausea. Short  of breath with
activity. O2@3L/NC with adequate oxygenation. Santana cath with pink tinged
urine. SCDs bilat. Plan of care discussed for this shift to include HS
meds/BiPAP/Calling for questions/concerns. Verbalizes understanding. Denies
needs. Call light in reach. Will monitor.

## 2022-01-27 NOTE — NUR
Rested well this shift. Denies nausea/pain. Short of breath with ativity.
Adequate output to ritter cath-pink tinged. Repositioned q2h. BiPAP on. Denies
current needs. Call light in reach. Will monitor.

## 2022-01-27 NOTE — NUR
contacted Chris at [facility] and faxed clinical updates. SW also
provided bipap settings. Chris advised patient still quaifies at this time and
will hopefully have a bed soon.

## 2022-01-28 VITALS — DIASTOLIC BLOOD PRESSURE: 78 MMHG | TEMPERATURE: 98.1 F | SYSTOLIC BLOOD PRESSURE: 102 MMHG | HEART RATE: 87 BPM

## 2022-01-28 VITALS — HEART RATE: 95 BPM | DIASTOLIC BLOOD PRESSURE: 78 MMHG | SYSTOLIC BLOOD PRESSURE: 100 MMHG | TEMPERATURE: 98.1 F

## 2022-01-28 VITALS — HEART RATE: 88 BPM | TEMPERATURE: 97.2 F | SYSTOLIC BLOOD PRESSURE: 99 MMHG | DIASTOLIC BLOOD PRESSURE: 58 MMHG

## 2022-01-28 VITALS — SYSTOLIC BLOOD PRESSURE: 126 MMHG | HEART RATE: 81 BPM | TEMPERATURE: 97.8 F | DIASTOLIC BLOOD PRESSURE: 85 MMHG

## 2022-01-28 VITALS — DIASTOLIC BLOOD PRESSURE: 67 MMHG | TEMPERATURE: 98.2 F | HEART RATE: 82 BPM | SYSTOLIC BLOOD PRESSURE: 107 MMHG

## 2022-01-28 LAB
ANION GAP SERPL CALC-SCNC: 9 MMOL/L (ref 7–16)
BUN SERPL-MCNC: 27 MG/DL (ref 8–26)
CALCIUM SERPL-MCNC: 8.9 MG/DL (ref 8.4–10.2)
CHLORIDE SERPL-SCNC: 98 MMOL/L (ref 98–107)
CO2 SERPL-SCNC: 35 MMOL/L (ref 23–31)
CREAT SERPL-SCNC: 0.83 MG/DL (ref 0.72–1.25)
ERYTHROCYTE [DISTWIDTH] IN BLOOD BY AUTOMATED COUNT: 13.7 % (ref 11.5–14.5)
GLUCOSE SERPL-MCNC: 91 MG/DL (ref 70–99)
HCT VFR BLD AUTO: 42.2 % (ref 42–52)
HGB BLD-MCNC: 12.9 G/DL (ref 13.5–18)
HYPOCHROMIA BLD QL SMEAR: (no result)
LYMPHOCYTES NFR BLD MANUAL: 12 % (ref 20–51)
MCH RBC QN AUTO: 28 PG (ref 27–31)
MCHC RBC AUTO-ENTMCNC: 31 G/DL (ref 33–37)
MCV RBC AUTO: 92 FL (ref 80–100)
MONOCYTES NFR BLD: 8 % (ref 1.7–9.3)
NEUTS SEG NFR BLD MANUAL: 80 % (ref 42–75.2)
PLATELET # BLD AUTO: 281 K/MM3 (ref 130–400)
PLATELET BLD QL SMEAR: NORMAL
PMV BLD AUTO: 10.2 FL (ref 7.4–10.4)
POTASSIUM SERPL-SCNC: 4.2 MMOL/L (ref 3.5–4.5)
RBC # BLD AUTO: 4.59 M/MM3 (ref 4.2–5.6)
SODIUM SERPL-SCNC: 142 MMOL/L (ref 136–145)

## 2022-01-28 NOTE — NUR
Called stating he needed to have a BM. Large semi liquid dark brown stool.
Repositioned in bed with pillow support. BiPAP on. Will monitor.

## 2022-01-28 NOTE — NUR
Mellissa, at [facility] contacted SW. Mellissa reports that they can
probably take the patient on Monday. She is going to reach out to [facility]
in [place] about the bipap and reach out to family. She reports that their
, Yunior, will be back in the office on Monday and for SW to touch base
with Yunior on Monday. SW to continue to follow.

## 2022-01-28 NOTE — NUR
Rested well over night. BiPaP on. Denied pain/nausea. Short of breath with
activity. VS remained stable. Had a med BM. Denies current needs. Call light
in reach. Will monitor.

## 2022-01-28 NOTE — NUR
THUY contacted Chris at [facility] to follow up on whether they have a bed or not
for the patient today. Chris reports that the patient is #4 on their list and
he is hoping to have discharges this weekend. THUY faxed updates to [facility].
 
The hospitalist notified THUY that pulmonology is recommending that the patient
will just need a bipap at night and could go to a SNF now. THUY staffed with RT
and obtained the patient's bipap settings. THUY contacted Mellissa, director of
nursing, at [facility] to inquire if they can accomodate a bipap.
Mellissa reports that they can. She requested that SW send her updates and
states that they would not be able to look at doing an admission until
possibly Monday, if they can accept. THUY faxed updates and the patient's bipap
settings to Mellissa at [facility].
 
THUY then met with the patient and updated him on the above. He is in agreement
to both [facility] or [facility]. THUY then contacted and updated the
patient's daughter, Ashley. She is also in agreement to the plan.
 
*Discharge plan: [facility] has accepted-awaiting bed.
  [facility] has not accepted yet. Awaiting them to
    review updates and they would need to obtain a bipap*

## 2022-01-28 NOTE — NUR
Mellissa, with [facility], reports that they are able to get the bipap
for the patient by Monday.

## 2022-01-29 VITALS — SYSTOLIC BLOOD PRESSURE: 86 MMHG | DIASTOLIC BLOOD PRESSURE: 42 MMHG | HEART RATE: 51 BPM | TEMPERATURE: 97.6 F

## 2022-01-29 VITALS — SYSTOLIC BLOOD PRESSURE: 96 MMHG | HEART RATE: 66 BPM | DIASTOLIC BLOOD PRESSURE: 58 MMHG | TEMPERATURE: 97.8 F

## 2022-01-29 VITALS — HEART RATE: 91 BPM | DIASTOLIC BLOOD PRESSURE: 50 MMHG | SYSTOLIC BLOOD PRESSURE: 110 MMHG | TEMPERATURE: 98.1 F

## 2022-01-29 VITALS — TEMPERATURE: 98.2 F | SYSTOLIC BLOOD PRESSURE: 110 MMHG | DIASTOLIC BLOOD PRESSURE: 68 MMHG | HEART RATE: 96 BPM

## 2022-01-29 VITALS — TEMPERATURE: 99.4 F | HEART RATE: 89 BPM | DIASTOLIC BLOOD PRESSURE: 61 MMHG | SYSTOLIC BLOOD PRESSURE: 98 MMHG

## 2022-01-29 VITALS — TEMPERATURE: 98.7 F | DIASTOLIC BLOOD PRESSURE: 55 MMHG | HEART RATE: 94 BPM | SYSTOLIC BLOOD PRESSURE: 133 MMHG

## 2022-01-29 VITALS — SYSTOLIC BLOOD PRESSURE: 96 MMHG | DIASTOLIC BLOOD PRESSURE: 67 MMHG

## 2022-01-29 LAB
ANION GAP SERPL CALC-SCNC: 11 MMOL/L (ref 7–16)
BUN SERPL-MCNC: 38 MG/DL (ref 8–26)
CALCIUM SERPL-MCNC: 9.4 MG/DL (ref 8.4–10.2)
CHLORIDE SERPL-SCNC: 95 MMOL/L (ref 98–107)
CO2 SERPL-SCNC: 36 MMOL/L (ref 23–31)
CREAT SERPL-SCNC: 1.03 MG/DL (ref 0.72–1.25)
ERYTHROCYTE [DISTWIDTH] IN BLOOD BY AUTOMATED COUNT: 14 % (ref 11.5–14.5)
GLUCOSE SERPL-MCNC: 128 MG/DL (ref 70–99)
HCT VFR BLD AUTO: 43 % (ref 42–52)
HGB BLD-MCNC: 13.3 G/DL (ref 13.5–18)
HYPOCHROMIA BLD QL SMEAR: (no result)
LYMPHOCYTES NFR BLD MANUAL: 7 % (ref 20–51)
MCH RBC QN AUTO: 29 PG (ref 27–31)
MCHC RBC AUTO-ENTMCNC: 31 G/DL (ref 33–37)
MCV RBC AUTO: 92 FL (ref 80–100)
MONOCYTES NFR BLD: 2 % (ref 1.7–9.3)
MYELOCYTES NFR BLD MANUAL: 2 % (ref 0–0)
NEUTS SEG NFR BLD MANUAL: 89 % (ref 42–75.2)
PLATELET # BLD AUTO: 337 K/MM3 (ref 130–400)
PLATELET BLD QL SMEAR: NORMAL
PMV BLD AUTO: 10.4 FL (ref 7.4–10.4)
POTASSIUM SERPL-SCNC: 4.9 MMOL/L (ref 3.5–4.5)
RBC # BLD AUTO: 4.66 M/MM3 (ref 4.2–5.6)
SODIUM SERPL-SCNC: 142 MMOL/L (ref 136–145)

## 2022-01-29 NOTE — NUR
Patient up to the bedside commode this am with sit to stand lift. Patient had
a BM, pericare provided. Patient sitting up in chair. he did well with
breakfast. Will monitor.

## 2022-01-29 NOTE — NUR
PATIENT DOING WELL TONIGHT. UP AND SAT AT EDGE OF BED. ALERT AND ORIENTED.
REDDNESS BETWEEN SKIN FOLDS IMPROVED, DESENEX APPLIED. SOME BLANCHABLE
REDDNESS NOTICED TO COCCYX AREA. ART TO DD WITH PINK TINGED URINE DRAINING.
PATIENT ASLEEP IN BED WEARING BIPAP PER RESPIRATORY. CALL LIGHT WITHIN REACH.

## 2022-01-29 NOTE — NUR
Patient assisted again to bedside commode. Bm. Pericares given. Sit to stand
lift used. Patient in bed for dinner & did well sitting up. Patient positioned
in bed for comfort after dinner, lying on his right side. dentures cares
provided.  glasses off. phone on charge. Bedside report to Linda DAVIDSON

## 2022-01-30 VITALS — DIASTOLIC BLOOD PRESSURE: 59 MMHG | TEMPERATURE: 98.2 F | HEART RATE: 103 BPM | SYSTOLIC BLOOD PRESSURE: 98 MMHG

## 2022-01-30 VITALS — SYSTOLIC BLOOD PRESSURE: 121 MMHG | HEART RATE: 94 BPM | TEMPERATURE: 98 F | DIASTOLIC BLOOD PRESSURE: 65 MMHG

## 2022-01-30 VITALS — HEART RATE: 87 BPM | SYSTOLIC BLOOD PRESSURE: 95 MMHG | TEMPERATURE: 97.6 F | DIASTOLIC BLOOD PRESSURE: 65 MMHG

## 2022-01-30 VITALS — HEART RATE: 81 BPM | TEMPERATURE: 97.3 F

## 2022-01-30 VITALS — DIASTOLIC BLOOD PRESSURE: 64 MMHG | TEMPERATURE: 97.8 F | HEART RATE: 74 BPM | SYSTOLIC BLOOD PRESSURE: 108 MMHG

## 2022-01-30 VITALS — TEMPERATURE: 98.2 F | SYSTOLIC BLOOD PRESSURE: 98 MMHG | HEART RATE: 74 BPM | DIASTOLIC BLOOD PRESSURE: 55 MMHG

## 2022-01-30 VITALS — HEART RATE: 85 BPM | SYSTOLIC BLOOD PRESSURE: 129 MMHG | TEMPERATURE: 98.1 F | DIASTOLIC BLOOD PRESSURE: 42 MMHG

## 2022-01-30 LAB
ANION GAP SERPL CALC-SCNC: 11 MMOL/L (ref 7–16)
BUN SERPL-MCNC: 36 MG/DL (ref 8–26)
CALCIUM SERPL-MCNC: 9.3 MG/DL (ref 8.4–10.2)
CHLORIDE SERPL-SCNC: 98 MMOL/L (ref 98–107)
CO2 SERPL-SCNC: 34 MMOL/L (ref 23–31)
CREAT SERPL-SCNC: 0.99 MG/DL (ref 0.72–1.25)
EOSINOPHIL NFR BLD: 1 % (ref 0–4)
ERYTHROCYTE [DISTWIDTH] IN BLOOD BY AUTOMATED COUNT: 14 % (ref 11.5–14.5)
GLUCOSE SERPL-MCNC: 104 MG/DL (ref 70–99)
HCT VFR BLD AUTO: 40.7 % (ref 42–52)
HGB BLD-MCNC: 12.5 G/DL (ref 13.5–18)
HYPOCHROMIA BLD QL SMEAR: (no result)
LYMPHOCYTES NFR BLD MANUAL: 8 % (ref 20–51)
MCH RBC QN AUTO: 28 PG (ref 27–31)
MCHC RBC AUTO-ENTMCNC: 31 G/DL (ref 33–37)
MCV RBC AUTO: 92 FL (ref 80–100)
MONOCYTES NFR BLD: 4 % (ref 1.7–9.3)
NEUTS SEG NFR BLD MANUAL: 87 % (ref 42–75.2)
PLATELET # BLD AUTO: 300 K/MM3 (ref 130–400)
PLATELET BLD QL SMEAR: NORMAL
PMV BLD AUTO: 10.3 FL (ref 7.4–10.4)
POTASSIUM SERPL-SCNC: 4.1 MMOL/L (ref 3.5–4.5)
RBC # BLD AUTO: 4.43 M/MM3 (ref 4.2–5.6)
SODIUM SERPL-SCNC: 143 MMOL/L (ref 136–145)
TOXIC GRANULES BLD QL SMEAR: PRESENT

## 2022-01-30 NOTE — NUR
PT RESTING QUIETLY IN BED, BIPAP IN PLACE. PT HAS BEEN A&O WHILE AWAKE ET
DENIES PAIN. PT DID AWAKEN DURING NIGHT TO SIT UP IN BED FOR WHILE, STATED
THAT HE WAS TIRED OF LAYING DOWN. PT USED CALL LIGHT WHEN READY TO LAY BACK
DOWN. PT IS UNABLE TO LIFT LEGS INTO BED OR WHILE HE IS IN BED. 2 ASSIST USED
TO REPOSTITON PT. SMEAR OF BM IS SEEN ON SHEETS, LINENS ARE CHANGED. CALL
LIGHT WITHIN REACH.

## 2022-01-30 NOTE — NUR
Assisted pt back to bed.  Took 2 assist.  Pt used walker.  He was very weak,
but was able to do it with time and direction.  Pt denied any pain with
transfer

## 2022-01-30 NOTE — NUR
Pt resting in bed, no complaints.  He is pleasent and denies any needs.
Ointment applied to his penis as well as the desenex powder to his groin.  Pt
has had breakfast.  No needs verbalized, call light within reach

## 2022-01-31 VITALS — HEART RATE: 83 BPM | TEMPERATURE: 97.7 F | DIASTOLIC BLOOD PRESSURE: 53 MMHG | SYSTOLIC BLOOD PRESSURE: 101 MMHG

## 2022-01-31 VITALS — DIASTOLIC BLOOD PRESSURE: 58 MMHG | SYSTOLIC BLOOD PRESSURE: 112 MMHG | HEART RATE: 92 BPM | TEMPERATURE: 97.2 F

## 2022-01-31 VITALS — DIASTOLIC BLOOD PRESSURE: 59 MMHG | TEMPERATURE: 97.4 F | SYSTOLIC BLOOD PRESSURE: 103 MMHG | HEART RATE: 95 BPM

## 2022-01-31 VITALS — DIASTOLIC BLOOD PRESSURE: 58 MMHG | HEART RATE: 92 BPM | TEMPERATURE: 97.2 F | SYSTOLIC BLOOD PRESSURE: 112 MMHG

## 2022-01-31 NOTE — NUR
PT SLEEPING IN BED, HAS SLEPT ON ET OFF THROUGHOUT NIGHT WITH BIPAP ON,
TOLERATES WELL. PT FREQUENTLY ASKS FOR ASSISTANCE WITH BIPAP MASK TO TAKE
DRINKS OF WATER. PT IS REPOSITIONED WITH 2 ASSIST DURING NIGHT. ART CATH
DRAINING DEPENDENTLY, URINE IS RED TINGED. PT IS PLEASANT ET TALKATIVE WHILE
AWAKE.

## 2022-01-31 NOTE — NUR
Patient dressed & ready for discharge. He has lunch prior to discharge &
did well. Int Dc. Patient transfered to wheelchair 2 assist. Report called to
nurse at [facility].

## 2022-01-31 NOTE — NUR
PATIENT ALERT AND ORIENTED X3. PATIENT'S VSS. PATIENT HERE FOR
RESPIRATORY FAILURE AND CHOLECYSTITIS. PATIENT UP IN CHAIR AND
PREPARING FOR DISCHARGE TODAY. APPLIED DESENEX TO CHRIS AREA AND APPLIED CREAM
TO CATHETER INSERTION SITE. PATIENT GIVEN MORNING MEDICATIONS. PATIENT DENIES
ANY PAIN AT THIS TIME.

## 2022-01-31 NOTE — NUR
Yunior, at [facility], reports that they are able to accept the patient
today and transportation was scheduled at 1130. THUY notified the clinical
team. THUY contacted and updated the patient's daughter, Ashley. She is in
agreement to the plan. THUY met with the patient and presented and read the IM
form outloud to him. The patient requested that SW contact his son, Toby
(ph#[phone]), and read the form to him. THUY then contacted Toby and read
the IM form outloud to him and updated him on the above. Toby verbalized
understanding and gave SW approval to sign the form on his behalf.
 
The patient is to discharge today, 1/31, to [facility] in [place]
for a skilled stay. Transportation was scheduled at 1130, via [facility]. No additional needs at this time.

## 2022-05-17 NOTE — NUR
Called to desk stating he was starving and hadnt eaten all day. BiPaP removed
and HFNC applied to could eat. Denies pain/nausea. Call lig in reach. Will
monitor. Additional Safety/Bands: